# Patient Record
Sex: MALE | Race: WHITE | URBAN - METROPOLITAN AREA
[De-identification: names, ages, dates, MRNs, and addresses within clinical notes are randomized per-mention and may not be internally consistent; named-entity substitution may affect disease eponyms.]

---

## 2019-05-31 ENCOUNTER — HISTORICAL (OUTPATIENT)
Dept: LAB | Facility: HOSPITAL | Age: 49
End: 2019-05-31

## 2021-10-27 ENCOUNTER — HISTORICAL (OUTPATIENT)
Dept: PREADMISSION TESTING | Facility: HOSPITAL | Age: 51
End: 2021-10-27

## 2021-10-28 ENCOUNTER — HISTORICAL (OUTPATIENT)
Dept: ADMINISTRATIVE | Facility: HOSPITAL | Age: 51
End: 2021-10-28

## 2021-10-29 ENCOUNTER — HISTORICAL (OUTPATIENT)
Dept: SURGERY | Facility: HOSPITAL | Age: 51
End: 2021-10-29

## 2022-04-30 NOTE — OP NOTE
Patient:   Steffen Del Real             MRN: [id number]            FIN: [id number]               Age:   50 years     Sex:  Male     :  1970   Associated Diagnoses:   None   Author:   Cosme Lynn MD operative note    Surgeon: Cosme Lynn M.D.    Procedure: Right renal ESWL    Preoperative diagnosis: Right renal calculus    Postop diagnosis: Same    Preoperative history and indication for procedure: This patient is a 50-year-old white male patient who is well-known to me.  He presented to me last week with right-sided colic symptoms and a CT scan which showed 6-8 mm right upper ureteral stone.  The seen consistent with UPJ stone when evaluated in my office with plain film.  There appeared to be an additional peripheral stone at the time of imaging in my office.  He is diabetic.  He was hurting several days prior to this evaluation.  I recommended we proceed with ESWL at his earliest convenience as least invasive way to encourage passage of the stone.    Procedure in detail: After K proper consent the patient was taken to the operating room where he was placed under general anesthesia.  He was positioned supine on the lithotripsy table.  The right renal calculus was targeted and underwent 2500 shockwaves at a maximum power setting of 7.  On completion the stone appeared to have fragmented fairly well.  The patient was awakened and extubated and transported to recovery room with stable vital signs and in stable condition.  He'll be discharged home with prescription for Norco and Flomax and I'll see him in the office in 2-4 weeks with KUB and ultrasound.  He'll be asked to strain his urine and bring fragments to follow-up appointment.  He'll resume his home diet medications activity should be restful for 48 hours then as tolerated.    Cosme Lynn M.D.